# Patient Record
Sex: FEMALE | Race: WHITE | Employment: STUDENT | URBAN - METROPOLITAN AREA
[De-identification: names, ages, dates, MRNs, and addresses within clinical notes are randomized per-mention and may not be internally consistent; named-entity substitution may affect disease eponyms.]

---

## 2022-05-25 ENCOUNTER — TELEPHONE (OUTPATIENT)
Dept: ORTHOPEDIC SURGERY | Age: 27
End: 2022-05-25

## 2022-05-25 NOTE — TELEPHONE ENCOUNTER
General Question     Subject: PATIENT IS BEING REFERRED TO DR. Juan Alvarez FOR A PRESSURE TEST FOR COMPARTMENT SYNDROME. AN APPOINTMENT IS NEEDED.     Patient:  Asiya Shaffer  Contact Number: [phone]

## 2022-05-26 ENCOUNTER — TELEPHONE (OUTPATIENT)
Dept: ORTHOPEDIC SURGERY | Age: 27
End: 2022-05-26

## 2022-06-07 ENCOUNTER — OFFICE VISIT (OUTPATIENT)
Dept: ORTHOPEDIC SURGERY | Age: 27
End: 2022-06-07

## 2022-06-07 DIAGNOSIS — M79.A22 EXERTIONAL COMPARTMENT SYNDROME OF BOTH LOWER EXTREMITIES: ICD-10-CM

## 2022-06-07 DIAGNOSIS — M79.A21 EXERTIONAL COMPARTMENT SYNDROME OF BOTH LOWER EXTREMITIES: ICD-10-CM

## 2022-06-07 DIAGNOSIS — M79.604 BILATERAL LEG PAIN: Primary | ICD-10-CM

## 2022-06-07 DIAGNOSIS — M79.605 BILATERAL LEG PAIN: Primary | ICD-10-CM

## 2022-06-07 PROCEDURE — 20950 MNTR INTRSTITIAL FLUID PRESS: CPT | Performed by: INTERNAL MEDICINE

## 2022-06-07 PROCEDURE — G8427 DOCREV CUR MEDS BY ELIG CLIN: HCPCS | Performed by: INTERNAL MEDICINE

## 2022-06-07 PROCEDURE — G8421 BMI NOT CALCULATED: HCPCS | Performed by: INTERNAL MEDICINE

## 2022-06-07 PROCEDURE — 99243 OFF/OP CNSLTJ NEW/EST LOW 30: CPT | Performed by: INTERNAL MEDICINE

## 2022-06-07 RX ORDER — LIDOCAINE HYDROCHLORIDE 10 MG/ML
5 INJECTION, SOLUTION INFILTRATION; PERINEURAL ONCE
Status: COMPLETED | OUTPATIENT
Start: 2022-06-07 | End: 2022-06-07

## 2022-06-07 RX ADMIN — LIDOCAINE HYDROCHLORIDE 5 ML: 10 INJECTION, SOLUTION INFILTRATION; PERINEURAL at 16:43

## 2022-06-07 RX ADMIN — LIDOCAINE HYDROCHLORIDE 5 ML: 10 INJECTION, SOLUTION INFILTRATION; PERINEURAL at 16:44

## 2022-06-07 NOTE — PROGRESS NOTES
Chief Complaint:   Chief Complaint   Patient presents with    Leg Pain     bilat lower legs, ref by Dr. Rama Hdz for compartment testing, pain past 1 yr, gradually worsening, now bilat calf pain and numbness in feet/ankles with walking >5 min          History of Present Illness:       Patient is a 27 y.o. female presents with the above complaint. She is seen in consultation at request of Dr. Nisha Hernandez for compartment pressure testing of bilateral lower extremities. The symptoms began 1 yearsago and started without an injury. The patient describes a aching, tightness pain that does radiate. The symptoms are intermittent  and are are worsening since the onset. Pain localizes to the Posterior aspect of the lower limbs and  is not predictably aggravated by weightbearing but predictably worsen with extensive walking activity of ADLs or recreational walking. There are not mechanical symptoms associated with the symptoms. There areneuritic symptoms involving the foot or ankle but no dynamic foot drop. The patient denies subjective instability about the foot or ankle and denies new onset or progressive weakness of the lower extremity. Pain level 9    The patient denies a pattern of activity related swelling. Treatment to date:none. Work-up is included MRA bilateral lower extremities and arterial Doppler with dynamic assessment of the popliteal artery flow. There is no no prior history of foot or ankle trauma. There is no prior history of autoimmune disease, crystal arthropathy, or crystal arthropathy.           Past Medical History:        Past Medical History:   Diagnosis Date    Port wine stain     Pyoderma gangrenosum          Past Surgical History:   Procedure Laterality Date    FOOT SURGERY           Present Medications:         Current Outpatient Medications   Medication Sig Dispense Refill    Norgestim-Eth Estrad Triphasic (TRI-SPRINTEC) 0.18/0.215/0.25 MG-35 MCG TABS Take 1 tablet by mouth daily 84 tablet 11    hydrocortisone (WESTCORT) 0.2 % cream Apply topically 2 times daily. 1 Tube 0     No current facility-administered medications for this visit. Allergies:      No Known Allergies     Social History:         Social History     Socioeconomic History    Marital status: Single     Spouse name: Not on file    Number of children: Not on file    Years of education: Not on file    Highest education level: Not on file   Occupational History    Not on file   Tobacco Use    Smoking status: Never Smoker    Smokeless tobacco: Not on file   Substance and Sexual Activity    Alcohol use: No    Drug use: No    Sexual activity: Not on file   Other Topics Concern    Not on file   Social History Narrative    Not on file     Social Determinants of Health     Financial Resource Strain:     Difficulty of Paying Living Expenses: Not on file   Food Insecurity:     Worried About Running Out of Food in the Last Year: Not on file    Clare of Food in the Last Year: Not on file   Transportation Needs:     Lack of Transportation (Medical): Not on file    Lack of Transportation (Non-Medical):  Not on file   Physical Activity:     Days of Exercise per Week: Not on file    Minutes of Exercise per Session: Not on file   Stress:     Feeling of Stress : Not on file   Social Connections:     Frequency of Communication with Friends and Family: Not on file    Frequency of Social Gatherings with Friends and Family: Not on file    Attends Roman [employer] Services: Not on file    Active Member of Clubs or Organizations: Not on file    Attends Club or Organization Meetings: Not on file    Marital Status: Not on file   Intimate Partner Violence:     Fear of Current or Ex-Partner: Not on file    Emotionally Abused: Not on file    Physically Abused: Not on file    Sexually Abused: Not on file   Housing Stability:     Unable to Pay for Housing in the Last Year: Not on file    Number of [place] in the Last Year: Not on file    Unstable Housing in the Last Year: Not on file        Review of Symptoms:    Pertinent items are noted in HPI    Review of systems reviewed from Patient History Form dated on today's date and   available in the patient's chart under the Media tab. Vital Signs: There were no vitals filed for this visit. General Exam:     Constitutional: Patient is adequately groomed with no evidence of malnutrition  Mental Status: The patient is oriented to time, place and person. The patient's mood and affect are appropriate. Vascular: Examination reveals no swelling or calf tenderness. Peripheral pulses are palpable and 2+. Lymphatics: no lymphadenopathy of the inguinal region or lower extremity      Physical Exam: bilateral leg/lower extremities      Primary Exam:    Inspection: No deformity atrophy or appreciable fascial herniations      Palpation: No focal tenderness      Range of Motion: Symmetric at the ankle      Strength: Normal lower extremity      Special Tests: Negative SLR      Skin: There are no rashes, ulcerations or lesions. Gait: Nonantalgic     Neurovascular - non focal and intact       Additional Comments:        Additional Examinations:               Office Imaging Results/Procedures PerformedToday:            Office Procedures:     Orders Placed This Encounter   Procedures    US EXTREMITY RIGHT NON VASC LIMITED     Standing Status:   Future     Number of Occurrences:   1     Standing Expiration Date:   6/7/2023     Order Specific Question:   Reason for exam:     Answer:   compartment testing    MN RECORD FLUID PRESSURE,MUSCLE     Compartment pressure testing bilateral lower extremities-superficial and deep posterior compartments  Ultrasound guided  "[employer] E ultrasound 10 MHz    The patient was placed supine on examination table.   The midportion of the right superficial posterior compartment of the Lower extremity was prepped in sterile fashion linear transducer was placed over the calf and the lateral gastrocnemius muscle was identified. A sterile prep was performed topical anesthetic was utilized. Using axial technique 25-gauge needle was advanced within the central aspect of the gastrocnemius muscle approximately 3 to 4 cc of 1% lidocaine was utilized. Lawtell pressure transducer was then inserted in the same needle track and the tip was advanced into the central aspect of the muscle at approximately 2.5 cm depth. A resting pressure 23 MmHg was obtained. The Mehdi pressure transducer was then withdrawn from the superficial posterior compartment. The midportion of the left deep posterior compartment was prepped in sterile fashion at the designated anatomic site determined with ultrasound imaging. After applying topical anesthetic, 25-gauge needle was advanced under direct guidance and was advanced into the central anterior aspect of the FDL muscle. Approximately 3 to 4 cc of 1% lidocaine was utilized. The Lawtell pressure transducer was then advanced into this same anatomic region of the FDL. The resting pressure of 32 MmHg was obtained. The Lawtell pressure transducer was then withdrawn from the lateral compartment. The patient subsequently began running on a treadmill and after 4 Minutes of treadmill walking at 5% graded incline the patient developed the typical symptoms of leg pain previously experienced with exertion. Exertion was discontinued the patient was then placed supine on examination table and the Mehdi pressure transducer was reinserted into the superficial posterior compartment of the right lower extremity. And immediate pressure of 61     mmHg was obtained. This remained elevated at 53 mmHg at 1 minute post exertion and remained elevated at 40mmHg at 5 minutes post exertion. Monitoring was discontinued distractor pressures are withdrawn Band-Aid to seal the puncture wound.     After a few minutes of of interval rest the patient again began walking on the treadmill at a 5% graded incline. After 4 Minutes of treadmill walking at 5% graded incline the patient developed the typical symptoms of leg pain experience with exertion. Exertion was discontinued patient was placed supine and examination table and the Dumas pressure transducer reinserted into the left deep posterior compartment of the deep posterior compartment under ultrasound guidance. An immediate pressure of 80 mmHg was obtained which decreased to 24 mmHg at 1 minute post exertion and decreased to 17mmHg at 5 minutes post exertion. Pressure monitoring was discontinued. The Mehdi pressure transducer withdrawn from the deep posterior compartment. Band-Aid to seal the puncture wound. Images stored    Patient remained in physical therapy department to ice and stretch      Other Outside Imaging and Testing Personally Reviewed:    No results found. FINAL IMPRESSIONS   Conclusions:  Duplex scan reveals no evidence of significant arterial occlusive disease in the   bilateral lower extremities.    Ankle/brachial index is within normal limits in the bilateral lower extremities.    Dorsi and plantar flexion maneuvers do not demonstrate any significant changes in bilateral   popliteal artery waveforms. Full report view including tables and additional findings is available in the link below    Procedure Note    Linda Richardson MD - 03/05/2022   Formatting of this note might be different from the original.   FINAL IMPRESSIONS   Conclusions:  Duplex scan reveals no evidence of significant arterial occlusive disease in the   bilateral lower extremities.    Ankle/brachial index is within normal limits in the bilateral lower extremities.    Dorsi and plantar flexion maneuvers do not demonstrate any significant changes in bilateral   popliteal artery waveforms.    Full report view including tables and additional findings is available in the link below  Specimen Collected: 03/05/22 10:26 PM Last Resulted: 03/05/22 10:26 PM   Received From: [facility]  Result Received: 05/26/22 11:02 AM   View Encounter     MRA ANGIOGRAM OF THE LEFT LOWER EXTREMITY AND RIGHT LOWER EXTREMITY     INDICATION: Intermittent claudication. Arterial duplex ultrasound indicates popliteal entrapment. TECHNIQUE: Time-of-flight and contrast-enhanced MRA images of the popliteal artery and tibial arteries was performed bilaterally. Imaging was obtained during both dorsiflexion and plantar flexion. MRA LEFT LOWER EXTREMITY: The course of the popliteal artery and popliteal vein is normal. No abnormal insertion of the gastrocnemius muscle is identified. No abnormal fibrous bands are identified. The popliteal artery is without flow-limiting stenosis, aneurysm or dissection. The anterior tibial artery, peroneal artery and posterior tibial artery are patent. MRA RIGHT LOWER EXTREMITY: The course of the popliteal artery and popliteal vein is normal. No abnormal insertion of the gastrocnemius muscle is identified. No abnormal fibrous bands are identified. The popliteal artery is without flow-limiting stenosis, aneurysm or dissection. The anterior tibial artery, peroneal artery and posterior tibial artery are patent. Procedure Note    Jeanie Narayanan MD - 02/02/2022   Formatting of this note might be different from the original.   MRA ANGIOGRAM OF THE LEFT LOWER EXTREMITY AND RIGHT LOWER EXTREMITY     INDICATION: Intermittent claudication. Arterial duplex ultrasound indicates popliteal entrapment. TECHNIQUE: Time-of-flight and contrast-enhanced MRA images of the popliteal artery and tibial arteries was performed bilaterally. Imaging was obtained during both dorsiflexion and plantar flexion. MRA LEFT LOWER EXTREMITY: The course of the popliteal artery and popliteal vein is normal. No abnormal insertion of the gastrocnemius muscle is identified. No abnormal fibrous bands are identified.  The popliteal artery is without flow-limiting stenosis, aneurysm or dissection. The anterior tibial artery, peroneal artery and posterior tibial artery are patent. MRA RIGHT LOWER EXTREMITY: The course of the popliteal artery and popliteal vein is normal. No abnormal insertion of the gastrocnemius muscle is identified. No abnormal fibrous bands are identified. The popliteal artery is without flow-limiting stenosis, aneurysm or dissection. The anterior tibial artery, peroneal artery and posterior tibial artery are patent. IMPRESSION:   1. Normal MRA of the left popliteal artery and right popliteal artery. Specifically, there is no evidence of any flow-limiting stenosis, luminal irregularity or popliteal entrapment. Assessment   Impression: . Encounter Diagnoses   Name Primary? 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 Mayco Vazquez to discuss and review compartment pressure results and consider surgical release of the deep posterior and superficial posterior compartments bilateral lower extremities. Referencing criteria by Sanjana Mcgee al is all criteria for the diagnosis of chronic exertional compartment syndrome involving the superficial posterior compartment bilateral lower extremities.   With respect to the deep posterior compartment pressure testing resting pressure was abnormal and consistent with the diagnosis of chronic exertional compartment syndrome, however the response to exertion was not diagnostic and also inconsistent with the expected physiologic response to exertion as the pressures measured lower after exertion then at rest.  I do not have a explanation for this inconsistency with a specific portion of the testing. The nature of the finding, probable diagnosis and likely treatment was thoroughly discussed with the patient. The options, risks, complications, alternative treatment as well as some of the differential diagnosis was discussed. The patient was thoroughly informed and all questions were answered. the patient indicated understanding and satisfaction with the discussion. Orders:        Orders Placed This Encounter   Procedures    US EXTREMITY RIGHT NON VASC LIMITED     Standing Status:   Future     Number of Occurrences:   1     Standing Expiration Date:   6/7/2023     Order Specific Question:   Reason for exam:     Answer:   compartment testing    [place] RECORD FLUID PRESSURE,MUSCLE           Disclaimer: \"This note was dictated with voice recognition software. Though review and correction are routine, we apologize for any errors. \"

## 2022-06-07 NOTE — LETTER
[address]  [address] [facility] [address]  Phone: [phone]  Fax: [phone]           Noé Rendon MD      Caren 10, 2022     Patient: Dossie Kocher   MR Number: [id number]   YOB: 1995   Date of Visit: 6/7/2022         Thank you for referring Dossie Kocher to me for evaluation/treatment. Below are the relevant portions of my assessment and plan of care. Impression: . Encounter Diagnoses   Name Primary? 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 Benji Carrillo to discuss and review compartment pressure results and consider surgical release of the deep posterior and superficial posterior compartments bilateral lower extremities. Referencing criteria by Janae Bird al is all criteria for the diagnosis of chronic exertional compartment syndrome involving the superficial posterior compartment bilateral lower extremities. With respect to the deep posterior compartment pressure testing resting pressure was abnormal and consistent with the diagnosis of chronic exertional compartment syndrome, however the response to exertion was not diagnostic and also inconsistent with the expected physiologic response to exertion as the pressures measured lower after exertion then at rest.  I do not have a explanation for this inconsistency with a specific portion of the testing. The nature of the finding, probable diagnosis and likely treatment was thoroughly discussed with the patient.  The options, risks, complications, alternative treatment as well as some of the differential diagnosis was discussed. The patient was thoroughly informed and all questions were answered. the patient indicated understanding and satisfaction with the discussion. Orders:        Orders Placed This Encounter   Procedures    US EXTREMITY RIGHT NON VASC LIMITED     Standing Status:   Future     Number of Occurrences:   1     Standing Expiration Date:   6/7/2023     Order Specific Question:   Reason for exam:     Answer:   compartment testing    [place] RECORD FLUID PRESSURE,MUSCLE           Disclaimer: \"This note was dictated with voice recognition software. Though review and correction are routine, we apologize for any errors. \"           If you have questions, please do not hesitate to call me. I look forward to following Milagros Dominguez along with you. Sincerely,        Wale Stanford MD    CC providers:  Daxa Richardson MD  [address] [place] [address].   [address]  [place]

## 2024-07-11 ENCOUNTER — OFFICE VISIT (OUTPATIENT)
Age: 29
End: 2024-07-11

## 2024-07-11 VITALS
TEMPERATURE: 97.8 F | DIASTOLIC BLOOD PRESSURE: 89 MMHG | SYSTOLIC BLOOD PRESSURE: 126 MMHG | WEIGHT: 210 LBS | HEIGHT: 65 IN | RESPIRATION RATE: 16 BRPM | BODY MASS INDEX: 34.99 KG/M2 | OXYGEN SATURATION: 97 % | HEART RATE: 111 BPM

## 2024-07-11 DIAGNOSIS — R39.9 LOWER URINARY TRACT SYMPTOMS (LUTS): ICD-10-CM

## 2024-07-11 DIAGNOSIS — R35.0 URINARY FREQUENCY: Primary | ICD-10-CM

## 2024-07-11 PROBLEM — I73.9 INTERMITTENT CLAUDICATION (HCC): Status: ACTIVE | Noted: 2023-11-30

## 2024-07-11 PROBLEM — Z98.890 HX OF FASCIOTOMY: Status: ACTIVE | Noted: 2022-09-25

## 2024-07-11 PROBLEM — M79.A21: Status: ACTIVE | Noted: 2022-07-24

## 2024-07-11 PROBLEM — M79.604 PAIN IN BOTH LOWER EXTREMITIES: Status: ACTIVE | Noted: 2021-12-15

## 2024-07-11 PROBLEM — M79.605 PAIN IN BOTH LOWER EXTREMITIES: Status: ACTIVE | Noted: 2021-12-15

## 2024-07-11 PROBLEM — M79.A22: Status: ACTIVE | Noted: 2022-07-24

## 2024-07-11 PROBLEM — F41.9 ANXIETY: Status: ACTIVE | Noted: 2020-09-10

## 2024-07-11 LAB
BILIRUBIN, POC: NEGATIVE
BLOOD URINE, POC: NEGATIVE
CLARITY, POC: CLEAR
COLOR, POC: YELLOW
GLUCOSE URINE, POC: NEGATIVE
KETONES, POC: NEGATIVE
LEUKOCYTE EST, POC: NEGATIVE
NITRITE, POC: NEGATIVE
PH, POC: 6.5
PROTEIN, POC: NEGATIVE
SPECIFIC GRAVITY, POC: 1.01
UROBILINOGEN, POC: 0.2

## 2024-07-11 RX ORDER — SPIRONOLACTONE 25 MG/1
100 TABLET ORAL DAILY
COMMUNITY

## 2024-07-11 RX ORDER — NITROFURANTOIN 25; 75 MG/1; MG/1
100 CAPSULE ORAL 2 TIMES DAILY
Qty: 14 CAPSULE | Refills: 0 | Status: SHIPPED | OUTPATIENT
Start: 2024-07-11 | End: 2024-07-18

## 2024-07-12 LAB — BACTERIA UR CULT: NORMAL

## 2024-07-12 NOTE — PATIENT INSTRUCTIONS
Urine culture sent, follow up with primary  48 hours will help with symptoms  Urinary tract infection: Drink 60 oz of water a day, take medication as prescribed, urinate after intercourse, no bubble baths, do not hold urine for long period of time, stay hydrated    Follow up with your pcp in 7 days if symptoms persist or if symptoms worsen.

## 2024-07-12 NOTE — PROGRESS NOTES
Neurological:      Mental Status: She is alert and oriented to person, place, and time.   Psychiatric:         Mood and Affect: Mood normal.         Behavior: Behavior normal.           An electronic signature was used to authenticate this note.    --Bj Amaya, EMMA - CNP